# Patient Record
Sex: MALE | Race: WHITE | Employment: STUDENT | URBAN - METROPOLITAN AREA
[De-identification: names, ages, dates, MRNs, and addresses within clinical notes are randomized per-mention and may not be internally consistent; named-entity substitution may affect disease eponyms.]

---

## 2023-05-15 DIAGNOSIS — Z13.0 SCREENING FOR SICKLE-CELL DISEASE OR TRAIT: ICD-10-CM

## 2023-05-15 DIAGNOSIS — Z13.6 SCREENING FOR HEART DISEASE: Primary | ICD-10-CM

## 2023-05-22 ENCOUNTER — OFFICE VISIT (OUTPATIENT)
Dept: FAMILY MEDICINE | Facility: CLINIC | Age: 23
End: 2023-05-22
Payer: COMMERCIAL

## 2023-05-22 ENCOUNTER — HOSPITAL ENCOUNTER (OUTPATIENT)
Dept: CARDIOLOGY | Facility: CLINIC | Age: 23
Discharge: HOME | End: 2023-05-22
Attending: FAMILY MEDICINE | Admitting: FAMILY MEDICINE
Payer: COMMERCIAL

## 2023-05-22 VITALS
BODY MASS INDEX: 24.14 KG/M2 | HEART RATE: 62 BPM | DIASTOLIC BLOOD PRESSURE: 89 MMHG | HEIGHT: 69 IN | SYSTOLIC BLOOD PRESSURE: 153 MMHG | WEIGHT: 163 LBS

## 2023-05-22 DIAGNOSIS — Z02.5 SPORTS PHYSICAL: Primary | ICD-10-CM

## 2023-05-22 PROCEDURE — 93005 ELECTROCARDIOGRAM TRACING: CPT

## 2023-05-22 NOTE — PROGRESS NOTES
"Vishal Berg presents for PPE for football  No health concerns  No HI or SI and no mental health concerns    Vitals: BP (!) 153/89   Pulse 62   Ht 1.753 m (5' 9\")   Wt 73.9 kg (163 lb)   BMI 24.07 kg/m    BMI= Body mass index is 24.07 kg/m .  Sport(s): Football    Vision: Right Eye: 20/20 Left Eye: 20/20 Both Eyes: 20/20  Correction: none  Pupils: equal    Sickle Cell Trait: Discussed  Concussions: Concussion fact sheet reviewed. Student Athlete gave written and verbal agreement to report any suspected concussions.    General/Medical  Eyes/Vision: Normal  Ears/Hearing: Normal  Nose: Normal  Mouth/Dental: Normal  Throat: Normal  Thyroid: Normal  Lymph Nodes: Normal  Lungs: Normal  Abdomen: Normal    Skin: Normal    Cardiovascular Screening  RRR  Heart Murmur:No Grade: NA  Symmetric Femoral pulses: Yes    Stigmata of Marfan's Syndrome - if appropriate:  Not applicable    Assessment/Plan  21 yo male here for sports physical, health  Reviewed EKG: normal  Negative sickle cell  Dr Watkins    COMMENTS, RECOMMENDATIONS and PARTICIPATION STATUS  Cleared    "

## 2023-05-22 NOTE — LETTER
"  5/22/2023      RE: Vishal Berg  [address] [facility] [address]     Dear Colleague,    Thank you for referring your patient, Vishal Berg, to the  [facility]. Please see a copy of my visit note below.    Vishal Berg presents for PPE for football  No health concerns  No HI or SI and no mental health concerns    Vitals: BP (!) 153/89   Pulse 62   Ht 1.753 m (5' 9\")   Wt 73.9 kg (163 lb)   BMI 24.07 kg/m    BMI= Body mass index is 24.07 kg/m .  Sport(s): Football    Vision: Right Eye: 20/20 Left Eye: 20/20 Both Eyes: 20/20  Correction: none  Pupils: equal    Sickle Cell Trait: Discussed  Concussions: Concussion fact sheet reviewed. Student Athlete gave written and verbal agreement to report any suspected concussions.    General/Medical  Eyes/Vision: Normal  Ears/Hearing: Normal  Nose: Normal  Mouth/Dental: Normal  Throat: Normal  Thyroid: Normal  Lymph Nodes: Normal  Lungs: Normal  Abdomen: Normal    Skin: Normal    Cardiovascular Screening  RRR  Heart Murmur:No Grade: NA  Symmetric Femoral pulses: Yes    Stigmata of Marfan's Syndrome - if appropriate:  Not applicable    Assessment/Plan  21 yo male here for sports physical, health  Reviewed EKG: normal  Negative sickle cell  Dr Watkins    COMMENTS, RECOMMENDATIONS and PARTICIPATION STATUS  Cleared        Again, thank you for allowing me to participate in the care of your patient.      Sincerely,    Vishal Watkins MD    "

## 2023-05-24 LAB
ATRIAL RATE - MUSE: 67 BPM
DIASTOLIC BLOOD PRESSURE - MUSE: NORMAL MMHG
INTERPRETATION ECG - MUSE: NORMAL
P AXIS - MUSE: 61 DEGREES
PR INTERVAL - MUSE: 124 MS
QRS DURATION - MUSE: 94 MS
QT - MUSE: 364 MS
QTC - MUSE: 384 MS
R AXIS - MUSE: 69 DEGREES
SYSTOLIC BLOOD PRESSURE - MUSE: NORMAL MMHG
T AXIS - MUSE: 37 DEGREES
VENTRICULAR RATE- MUSE: 67 BPM

## 2023-09-07 ENCOUNTER — DOCUMENTATION ONLY (OUTPATIENT)
Dept: FAMILY MEDICINE | Facility: CLINIC | Age: 23
End: 2023-09-07

## 2023-09-12 NOTE — PROGRESS NOTES
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  [phone] Fax:  [phone]  Email:  [email or web address].          Anonymously report concerns to Asa at [facility] or call [phone]; or          Direct concerns to Katharine Wilkins,   for Health &  Performance, [facility], Athletic Medicine, [address] [place], [place] [id number],   [phone].  Client Bill of Rights & Privacy Notice  As a consumer of psychological services offered by psychologists licensed by the [facility], you have the right to:           Expect that the psychologist has met the minimal qualifications of training and  experience required by state law;           Examine public records maintained by the Board of Psychology, which contain the  credentials of the psychologist;           Obtain a copy of the rules of  conduct from the [facility] and Public Documents  Division, Department of Administration: [address], [place] [address];           Report complaints to the [place] Board of Psychology [address], [address] [place] [address] Phone:  [phone] Fax:  [phone]  Email:  [email or web address]           Be informed of the psychologist s areas of clinical competence as submitted to the  Board of Psychology.  [facility] s sport psychologists  competencies include:  o   Providing individual, group/team therapy and mental skills training to children,  adolescents and adults, and consultation to support staff, medical team members or other  organizational client stakeholders.  o   Providing sport psychology services to athletes, coaches, & sport personnel from all  competitive levels  o   Administration and interpretation of standardized measures of personality, cognitive  functioning, aptitudes, and interests to adolescents and adults  o   Designing and implementing psycho-educational workshops for, providing training to,  and teaching adolescents and adults  o   Conducting psychological research  o   Providing clinical supervision and training to psychology graduate students  o   Teaching educational courses in psychology  o   Providing organizational consulting services to family-owned and privately held  organizations           Be provided with a non-technical explanation about the nature and purpose of the  psychological procedures to be used in your treatment, upon request;           Be free from being the object of discrimination on the basis of race, Pentecostalism, gender, or  other unlawful categories while receiving psychological services;           Be informed of the cost of professional services before receiving the services;  Ramin Latham, Ph.D., , [facility] ([id number]) License LP #[id number] [facility] [address] [place] [address]  [phone]  [facility], [facility] [address] [place] [address] [phone] [email or web address]  Page 5 of 10           Be free from exploitation for the benefit or advantage of the psychologist;           Privacy as defined by rule and law;           Have access to your records as provided in subpart 1a and [place] Statutes section  144.335 subdivision 2:  o   Upon request, the psychologist will supply complete and current information in nontechnical language about your diagnosis, treatment, and prognosis if you meet criteria for a  mental health diagnosis.  o   Upon written request, the psychologist will promptly furnish copies of your records or,  with your consent, a summary of the record.  o   If the psychologist reasonably determines that the information you have requested  would be detrimental to your physical and mental health, the psychologist may withhold  that information from you and supply it instead to an appropriate third party or other  treatment provider.  That third party or treatment provider may release the information to  you.  o   The psychologist may also withhold information that you have requested if, prior to your  request, that psychologist has defined and described a specific basis for withholding that  information.           Be informed about disclosures of your private records that may be made without your  written consent.  Your information shared with the psychologist will be kept confidential  unless you are in imminent risk of hurting yourself, you are in imminent risk of hurting  another person, you know of minors or vulnerable adults who are being hurt or neglected,  or if you are a woman who is pregnant and using certain classes of illicit drugs.  In those  situations, appropriate emergency or health care personnel will be contacted in order to  address those safety issues.  If you have been exploited or abused by a previous  psychological  treatment provider, that provider s licensing board will be contacted.   Additionally, if a subpoena is issued and requires that a copy of your counseling records be  turned over, the psychologist will be required to provide a copy of your records to comply  with the court order. If you have concerns about the services you have been provided, you  may also choose to file a complaint to your psychologist s supervisor.  HOW HEALTH INFORMATION MAY BE USED AND DISCLOSED AND HOW YOU CAN GET ACCESS TO  THIS INFORMATION. PLEASE REVIEW IT CAREFULLY.  [facility] understands that health information about you and your  health care is personal. We are committed to protecting health information about you in  compliance with the Federal Health Insurance Portability and Accountability Act of 1996  ( HIPPA ), the [place] Health Records Act, and other applicable Federal and State laws  and administrative regulations. We create a record of the care and services you receive. We  need this record to provide you with quality care and to comply with certain legal  requirements. This notice applies to all of the records of your care generated by [facility]. This notice will tell you about the ways in which we can use and  disclose health information about you. We also describe your rights to the health  information we keep about you, and describe certain obligations we have regarding the use  and disclosure of your health information. We are required by law to:   Make sure that Protected Health Information ( PHI ) that identifies you is kept private;   Give you this notice of our legal duties and privacy practices with respect to health  Ramin Latham, Ph.D., , [facility] ([id number]) License  #[id number] [facility] [address] [place] [address] [phone]  [facility] [address] [place] [address] [phone]  [email or web address]  Page 6 of 10  information;   Follow the terms of the notice that is currently in effect;   We can change the terms of this notice, and such changes will apply to all information we  have about you. The new notice will be available upon request, in our office, and on our  website;  HOW WE MAY USE AND DISCLOSE HEALTH INFORMATION ABOUT YOU  The following categories describe different ways that we use and disclose health  information. For each category of uses or disclosures we will explain what we mean and try  to give some examples. Not every use or disclosure in a category will be listed. However, all  of the ways we are permitted to use and disclose information will fall within one of the  categories.  For Treatment Payment, or Health Care Operations: Federal privacy rules and regulations  allow health care providers who have direct treatment relationship with the patient/client to  use or disclose the patient/client s personal health information without the patient s written  authorization in order to carry out the health care provider s own treatment, payment or  health care operations. We may also disclose your protected health information for the  treatment activities of any health care provider. This too can be done without your written  authorization. For example, if a clinician were to consult with another licensed health care  provider about your condition, we would be permitted to use and disclose your personal  health information which is otherwise confidential, in order to assist the clinician in  diagnosis and treatment of your mental health condition.  Disclosures for treatment purposes are not limited to the minimum necessary standard,  because therapists and other health care providers need access to the full record and/or full  and complete information in order to provide quality care. The word  treatment  includes,  among other things, the coordination and management  of health care providers with a third  party, consultations between health care providers, and referrals of a patient for health care  from one health care provider to another.  Lawsuits and Disputes: If you are involved in a lawsuit, we may disclose health information  in response to a court or administrative order. We may also disclose health information  about your child in response to a subpoena, discovery request, or other lawful process by  someone else involved in the dispute, but only if efforts have been made to tell you about  the request or to obtain an order protecting the information requested.  CERTAIN USES AND DISCLOSURES REQUIRE YOUR AUTHORIZATION  1.     Psychotherapy Notes. We do keep  psychotherapy notes  as that term is defined in 45  CFR   164.501, and any use or disclosure of such notes requires your authorization unless  the use or disclosure is:  a) For our use in treating you; b) For our use in training or supervising mental health  practitioners to help them improve their skills in group, joint, family, or individual counseling  or therapy; c) For our use in defending [facility] in legal proceedings  instituted by you; d) For use by the  of Health and Human Services to investigate  our compliance with HIPAA and other applicable laws and regulations; e) Required by law  where the use or disclosure is limited to the requirements of such law; f) Required by law for  certain health oversight activities pertaining to the originator of the psychotherapy notes;    g) Required by a  who is performing duties authorized by law; h) Required to help  Ramin Latham, Ph.D., , [facility] ([id number]) License LP #[id number] [facility] [address] [place] [address] [phone]  [place] [facility], [facility] [address] [place], [place] [address] [phone] [email or web address]."[employer]  Page 7 of 10  avert a serious threat to the health  and safety of others;  2.     Marketing Purposes. We will NOT use or disclose your Protected Health Information  ( PHI ) for marketing purposes.  3.     Sale of PHI. We will NOT sell your Protected Health Information ( PHI ).  CERTAIN USES AND DISCLOSURES DO NOT REQUIRE YOUR AUTHORIZATION  Subject to certain limitations in the law, we can use and disclose your Protected Health  Information ( PHI ) without your authorization for the following reasons:  1.     When disclosure is required by state or federal law, and the use or disclosure complies  with and is limited to the relevant requirements of such law;  2.     For public health activities, including reporting suspected child, elder, or dependent  adult abuse, or preventing or reducing a serious threat to anyone s health or safety;  3.     For health oversight activities, including audits and investigations;  4.     For judicial and administrative proceedings, including responding to a court or  administrative order, although our preference is to obtain an authorization from you before  doing so;  5.     For law enforcement purposes, including reporting crimes occurring on our premises;  6.     To coroners or medical examiners, when such individuals are performing duties  authorized by law;  7.     For research purposes, including studying and comparing the mental health of patients  who received one form of therapy versus those who received another form of therapy for  the same condition;  8.     Specialized government functions, including, ensuring the proper execution of   missions; protecting the ; conducting intelligence or counterintelligence operations; or, helping to ensure the safety of those working within or housed in  correctional institutions;  9.     For workers' compensation purposes. Although our preference is to obtain an  authorization from you, we may provide your Protected Health Information ( PHI ) in  order  to comply with workers' compensation laws;  10.  Appointment reminders and health related benefits or services. We may use and  disclose your Protected Health Information ( PHI ) to contact you to remind you that you  have an appointment with us. We may also use and disclose your Protected Health  Information ( PHI ) to tell you about treatment alternatives, or other health care services or  benefits that we offer.  YOU TO HAVE THE RIGHT TO OBJECT TO CERTAIN USES AND DISCLOSURES  You have the right to object to disclosures to family, friends, or others. We may provide your  Protected Health Information ( PHI ) to a family member, friend, or other person that you  indicate is involved in your care or the payment for your health care, unless you object in  whole or in part. The opportunity to consent or object may be obtained retroactively in  emergency situations.  YOU HAVE THE FOLLOWING RIGHTS WITH RESPECT TO YOUR PROTECTED HEALTH  INFORMATION ( PHI )  1.     The Right to Request Limits on Uses and Disclosures of Your Protected Health  Ramin Latham, Ph.D., [facility] ([id number]) License  #[id number] [place] [facility] [address] [place] [address] [phone]  [place] [facility], [address], [address] [place], [place] [address] [phone] www.[email or web address]  Page 8 of 10  Information ( PHI ). You have the right to ask us not to use or disclose certain Protected  Health Information ( PHI ) for treatment, payment, or health care operations purposes. We  are not required to agree to your request, and may say  no  if we believe it would affect your  health care;  2.     The Right to Choose How We Send Protected Health Information ( PHI ) to You. You  have the right to ask us to contact you in a specific way (for example, home or office phone)  or to send mail to a different address, and we will agree to all reasonable requests;  3.     The Right to See and Get Copies  of Your Protected Health Information ( PHI ).  You  have the right to get an electronic or paper copy of your medical record and other  information that we have about you.  Pursuant to [place] law, you also have the right to  obtain an electronic or paper copy of  psychotherapy notes.  We will provide you with a copy  of your record, or a summary of it, if you agree to receive a summary, within 30 days of  receiving your written request, and we may charge a reasonable, cost based fee for doing  so;  4.     The Right to Get a List of the Disclosures We Have Made. You have the right to  request a list of instances in which we have disclosed your Protected Health Information  ( PHI ) for purposes other than treatment, payment, or health care operations, or for which  you provided us with an Authorization. We will respond to your request for an accounting of  disclosures within 60 days of receiving your request. The list we will give you will include  disclosures made in the last six years unless you request a shorter time. We will provide the  list to you at no charge, but if you make more than one request in the same year, we will  charge you a reasonable cost based fee for each additional request;  5.     The Right to Correct or Update Your Protected Health Information ( PHI ). If you  believe that there is a mistake in your Protected Health Information ( PHI ), or that a piece of  important information is missing from your Protected Health Information ( PHI ), you have  the right to request that [facility], [facility] correct the existing information or  add the missing information. We may say  no  to your request, but we will tell you why in  writing within 60 days of receiving your request;  6.     The Right to Get a Paper or Electronic Copy of this Notice. You have the right get a  paper copy of this Notice, and you have the right to get a copy of this notice by e-mail. And,  even if you have agreed to  receive this Notice via e-mail, you also have the right to request a  paper copy of it;  7.     The Right to Authorize Another to Exercise Your Rights on Your Behalf.  You have the  right to execute a Medical Power of  that authorizes another person to exercise  your HIPPA and [place] Medical Records Act rights on your behalf.  ACKNOWLEDGMENT OF RECEIPT OF PRIVACY NOTICE  Under the Health Insurance Portability and Accountability Act of 1996 (HIPAA) and the  [place] Health Records Act, you have certain rights regarding the use and disclosure of  your protected health information. By your signature or by checking the box below, you are  acknowledging that you have received a copy of [facility] Notice of Privacy Practices.  Mental Health Resources  [facility]  Urgent counselors are available in person and by phone between 8 a.m. - 4:30 p.m. on  Monday, Tuesday, Wednesday, and Friday; 9 a.m. - 4:30 p.m. on Thursdays. Call [phone]  Ramin Latham, Ph.D., [facility] ([id number]) License  #[id number] [place] [facility] [address] [place] [address] [phone]  [facility], [address], [address] [place] [address] [phone] www.[employer]  Page [phone] to speak with an urgent counselor or come to the Mental Health Clinic on the Fourth  floor of [facility] located on the [place] at [address] [place] [address] SE. These services are  not the same as those available in an emergency room and should not be substituted for a  situation requiring immediate intervention. There may be a wait to speak with an urgent  counselor.   Student Counseling Services  [phone].  Walk-in crisis counseling is offered from 8:00 a.m. to 4:00 p.m. at the  [facility] location at [address] [place] [address], [address] [place] [address] SE. These services  are not the same as those available in an emergency room and should not be substituted  for a situation  "requiring immediate intervention.   De-Stress - Schedule a  stress check-in  to get 1-session help with great ways to manage the  stresses of student life. You can sign up for a free, confidential and meet with trained  student helpers who know first-hand the stresses of [email or web address]; [phone]  Crisis Support  If you are in a life-threatening emergency, call 911. Or you may call the Crisis Connection at  (107) 559-3976, text \"UMN\" to 43274 on evenings and weekends if you feel unsafe.  Additional State and National Helpline Information  Crisis Text Line - Text HOME to 757692  Suicide Prevention Lifeline - 892-552-POSS (1468)  Suicide Hotline in Trinidadian: 7-846-659-6540  LGBT Youth Suicide Hotline: 5-366-1-U-LACHELLE  Agreement  My signature below indicates I have read and agree to the above information in its entirety  and agree to abide by these terms during our professional relationship. This document also  serves as an acknowledgement that I have reviewed and read the Notice of Privacy practices  and the Client Bill of Rights.   By signing this form, I consent to and authorize my sport  psychology professional to assess and provide psychological services to me. I understand  that my sport psychology professional is available to explain the purpose of sport  psychology services and that I have the right to refuse services.  Client  Vishal Berg  Signed by Vishal Berg  September 6, 2023 at 7:49 pm  IP address: [phone]  Ramin Latham, Ph.D., , [facility] ([id number]) License  #[id number] [facility] [place] [id number] [place] [address] [phone]  [facility], [facility] [address] [place], [place] [address] [phone] [email or web address]  Page 10 of  1     "

## 2023-09-18 ENCOUNTER — DOCUMENTATION ONLY (OUTPATIENT)
Dept: FAMILY MEDICINE | Facility: CLINIC | Age: 23
End: 2023-09-18

## 2023-09-18 NOTE — PROGRESS NOTES
[facility] ATHLETIC MEDICINE  [facility] (formerly [facility]   Sport Psychology Progress Note      Location of Visit: [facility] Athletic Department - [address]  Date of Visit: 9/18/23  Duration of Session: 45 minutes  Referred by: self    Vishal Berg presented on time for sport psychology appointment.     Emergency Contact Name and Relation to you (e.g., parent, guardian, , etc.)  Roxi Rodgers    Emergency Contact Cell #:  [phone]    What brings you into Sport Psychology at this time?  Injury and just to talk    Vishal Berg is a 22 year old white male.  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     Self-reported Concerns/Symptoms:  Injury  Sport performance  transition    Suicide and Risk Assessment:  Recent suicidal thoughts: No  Past suicidal thoughts: No  Recent homicidal thoughts: No  Any attempts in the past: No  Any family/friends/loved ones die by suicide: No  Plan or considering various methods: No  Access to guns: No  Protective factors: no h/o suicide attempt, no plan or intent, no h/o risky impulsive behavior, no access to lethal means, h/o seeking help when needed, future oriented, feeling hopeful, none to minimal alcohol use , commitment to family, good social support  , and stable housing  Verbal contract for safety: Yes    Vishal denies current urges to self-harm, homicidal ideation, suicidal ideation, means, plans, or intent.    Mental Status & Observations:  Vishal appeared generally alert and oriented. Dress was appropriate to the weather and occasion. Grooming and hygiene were appropriate. Eye contact was good. Speech was of normal volume and normal. Mood was appropriate with congruent affect. Thought processes were relevant, logical and goal-directed. Thought content was within normal limits with no evidence of psychotic or paranoid features. Memory appeared intact. Insight and judgment appeared age appropriate  "with good focus in session.  He exhibited normal motor activity during the appointment.  Behavior was actively engaged, candid, cooperative, and open.     Response & Observations:  Vishal reports that he is noticing feeling nervous for his kicks due to limited reps and perceived high pressure to do well. He is not starting currently. He reports working to navigate the sport environment.     Intervention:   Provided psychoeducation about the ANS and the importance of diaphragmatic breathing. Educated and guided Vishal through how to do effective breathing techniques to use in sport. Introduced the skillo f imagery and vivid imagery for rehearsal of skills and responses to adversity and varying scenarios. Also discussed additional skills such as 1/2 smile from Dialectical behavior therapy distress tolerance module and the skill of having a mindset of less self-consciousness. Emailed resources [see below]    Coping skills, strengths and supports:   Communication skills, Coping skills, Exercise, Family support, Insight and sensitivity, Maturity and judgment, Relationship stability, Hoahaoism/spirituality , Socioeconomic stability, Social support system, and Use of available services    Briefly discussed his growth-mindset, leaning on social support and his juan jose when struggling, honoring that he is the expert on himself, and to not take things as personally from coaches. He reports a desire to \"work with\" the situation he is in as best he can.     Goals for counseling:  Vishal reports that his goals are: 1) managing the anxiety that emerges from his interactions with is sport environment/, and learn how to interact with it in a way that does not let it \"interfere\".      Clinical impressions or Other:  R/O 309.28 Adjustment Disorder w/ anxiety and depressed mood    Therapy objectives/goals:  Assist with transition into college  Build resilience and response to adversity  Decrease anxiety symptoms  Decrease depressive " "symptoms  Decrease perceived stress  Enhance self-care  Increase mindfulness and the ability to be present  Increase self-esteem and self-worth  Improve mood  Provide support  Support injury/recovery  Teach and improve coping skills  Help with transition into new sport culture; and coping with coaching dynamics    Therapy follow-up plan:  Individual counseling sessions as needed  Follow up with sports medicine physician      Ramin Latham, PhD , [facility]      EMAIL:  Ramin Latham <[email or web address]>  11:44?AM (7 minutes ago)  to Vishal      The concept behind the \"F-it Mindset\" is more a lack of self-consciousness and not worrying about what others are thinking about you or your performance. And less self-consciousness tends to be associated with better sport performance & Flow states.  Helps us actually be free to focus on just performing and doing our thing....not being self-critical or being on the 'outside looking in' at ourselves and judging our performance...or ourselves in life, for that matter. :)      https://[employer]/santo-ioana-flips-off-dad-every-race/      https://www.psychologytoday.com/blog/the-power-prime/201307/what-it-takes-get-the-f-it-attitude        Also, you might like the CALM.[email or web address] for anxiety guided tips.      Dr.C"

## 2023-09-28 ENCOUNTER — DOCUMENTATION ONLY (OUTPATIENT)
Dept: FAMILY MEDICINE | Facility: CLINIC | Age: 23
End: 2023-09-28

## 2023-09-28 NOTE — PROGRESS NOTES
[facility] ATHLETIC MEDICINE  [facility]   Sport Psychology Progress Note      Location of Visit: [facility] Athletic Department - [address]  Date of Visit: 9/28/23  Duration of Session: 45 minutes  Referred by: self    Vishal Berg presented on time for sport psychology appointment.     Emergency Contact Name and Relation to you (e.g., parent, guardian, , etc.)  Roxi Rodgers    Emergency Contact Cell #:  [phone]    What brings you into Sport Psychology at this time?  Injury and just to talk    Vishal Berg is a 22 year old white male.  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     Self-reported Concerns/Symptoms:  Injury  Sport performance  transition    Suicide and Risk Assessment:  Recent suicidal thoughts: No  Past suicidal thoughts: No  Recent homicidal thoughts: No  Any attempts in the past: No  Any family/friends/loved ones die by suicide: No  Plan or considering various methods: No  Access to guns: No  Protective factors: no h/o suicide attempt, no plan or intent, no h/o risky impulsive behavior, no access to lethal means, h/o seeking help when needed, future oriented, feeling hopeful, none to minimal alcohol use , commitment to family, good social support  , and stable housing  Verbal contract for safety: Yes    Vishal denies current urges to self-harm, homicidal ideation, suicidal ideation, means, plans, or intent.    Mental Status & Observations:  Vishal appeared generally alert and oriented. Dress was appropriate to the weather and occasion. Grooming and hygiene were appropriate. Eye contact was good. Speech was of normal volume and normal. Mood was appropriate with congruent affect. Thought processes were relevant, logical and goal-directed. Thought content was within normal limits with no evidence of psychotic or paranoid features. Memory appeared intact. Insight and judgment appeared age appropriate  "with good focus in session.  He exhibited normal motor activity during the appointment.  Behavior was actively engaged, candid, cooperative, and open.     Response & Observations:  Vishal reports that he is kicking well, managing his sport environment better and feels as though his pre-kick routine is improved. However he reports noticing anxiety with football, particularly with a strong need to do reps after his kicks.     Intervention:   Reviewed psychoeducation about the ANS fight-flight-freeze response and the importance of diaphragmatic breathing, and where to use breathing in his pre-kick routine. Also discussed his anxiety (OCD-like) \"compulsion\" with needing to \"do reps\" of a kick-drill after kicks, where he notes feeling anxious if he does not keep doing them. Provided education about tolerating discomfort/anxiety, exposure/response prevention, and aligning his behavior with his goals and values. Reflected on his observation of himself as a more relaxed \"chill\" player in  and playing really well then and how he'd like to return tho that approach to his kicking. He notes over-thinking and \"trying too hard\" at times here. He shared that perspective-taking and gratitude have been particularly helpful to him in learning to navigate his sport environment.     Coping skills, strengths and supports:   Communication skills, Coping skills, Exercise, Family support, Insight and sensitivity, Maturity and judgment, Relationship stability, Rastafarian/spirituality , Socioeconomic stability, Social support system, and Use of available services    Briefly discussed his growth-mindset, leaning on social support and his juna jose when struggling, honoring that he is the expert on himself, and to not take things as personally from coaches. He reports a desire to \"work with\" the situation he is in as best he can.     Goals for counseling:  Vishal reports that his goals are: 1) managing the anxiety that emerges from his interactions " "with is sport environment/, and learn how to interact with it in a way that does not let it \"interfere\".      Clinical impressions or Other:  R/O 309.28 Adjustment Disorder w/ anxiety and depressed mood    Therapy objectives/goals:  Assist with transition into college  Build resilience and response to adversity  Decrease anxiety symptoms  Decrease depressive symptoms  Decrease perceived stress  Enhance self-care  Increase mindfulness and the ability to be present  Increase self-esteem and self-worth  Improve mood  Provide support  Support injury/recovery  Teach and improve coping skills  Help with transition into new sport culture; and coping with coaching dynamics    Therapy follow-up plan:  Individual counseling sessions as needed  Follow up with sports medicine physician      Ramin Latham, PhD LP, [facility]      "

## 2023-10-19 ENCOUNTER — DOCUMENTATION ONLY (OUTPATIENT)
Dept: FAMILY MEDICINE | Facility: CLINIC | Age: 23
End: 2023-10-19

## 2023-10-19 NOTE — PROGRESS NOTES
[facility] ATHLETIC MEDICINE  [facility]   Sport Psychology Progress Note      Location of Visit: [facility] Athletic Department - [address]  Date of Visit: 10/19/23  Duration of Session: 45 minutes  Referred by: self    Vishal Berg presented on time for sport psychology appointment.     Emergency Contact Name and Relation to you (e.g., parent, guardian, , etc.)  Roxi Rodgers  Emergency Contact Cell #:  [phone]    What brings you into Sport Psychology at this time?  Injury and just to talk    Vishal Berg is a 22 year old white male.  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     Self-reported Concerns/Symptoms:  Injury  Sport performance  transition    Suicide and Risk Assessment:  Recent suicidal thoughts: No  Past suicidal thoughts: No  Recent homicidal thoughts: No  Any attempts in the past: No  Any family/friends/loved ones die by suicide: No  Plan or considering various methods: No  Access to guns: No  Protective factors: no h/o suicide attempt, no plan or intent, no h/o risky impulsive behavior, no access to lethal means, h/o seeking help when needed, future oriented, feeling hopeful, none to minimal alcohol use , commitment to family, good social support  , and stable housing  Verbal contract for safety: Yes    Vishal denies current urges to self-harm, homicidal ideation, suicidal ideation, means, plans, or intent.    Mental Status & Observations:  Vishal appeared generally alert and oriented. Dress was appropriate to the weather and occasion. Grooming and hygiene were appropriate. Eye contact was good. Speech was of normal volume and normal. Mood was appropriate with congruent affect. Thought processes were relevant, logical and goal-directed. Thought content was within normal limits with no evidence of psychotic or paranoid features. Memory appeared intact. Insight and judgment appeared age appropriate  "with good focus in session.  He exhibited normal motor activity during the appointment.  Behavior was actively engaged, candid, cooperative, and open.     Response & Observations:  Vishal reports that challenged himself to do less \"compulsive-like\" drills for kicking and is finding it difficult to resist the urge after a missed kick. He reports noticing anxiety with football and increased self-doubt after misses, particularly with a strong need to do reps after his kicks.     Intervention:   Used Acceptance and Commitment Therapy principles of aligning his behavioral responses with his goals and value, rather than his emotion. He was very responsive to this skill. Discussed measuring self-worth and confidence less on solely outcome and more on additional controllable elements such as preparation, worth ethic, attitude, response, having fun, trusting his process and his commitment level. Reinforced responding to anxiety in ways that align with his goals and values, particularly in the \"compulsive-like\" moments after missed kicks. Encouraged \"staying the course\" and resisting  the urge/tolerating the angst ,as a practice this week. Increased awareness at how this response can provide an opportunity for gaining mastery and confidence.  Used a metaphor of a personal \"north\" compass for himself.    Coping skills, strengths and supports:   Communication skills, Coping skills, Exercise, Family support, Insight and sensitivity, Maturity and judgment, Relationship stability, Evangelical/spirituality , Socioeconomic stability, Social support system, and Use of available services    Goals for counseling:  Vishal reports that his goals are: 1) managing the anxiety that emerges from his interactions with is sport environment/, and learn how to interact with it in a way that does not let it \"interfere\".      Clinical impressions or Other:  R/O 309.28 Adjustment Disorder w/ anxiety    Therapy objectives/goals:  Assist with " transition into college  Build resilience and response to adversity  Decrease anxiety symptoms  Decrease depressive symptoms  Decrease perceived stress  Enhance self-care  Increase mindfulness and the ability to be present  Increase self-esteem and self-worth  Improve mood  Provide support  Support injury/recovery  Teach and improve coping skills  Help with transition into new sport culture; and coping with coaching dynamics    Therapy follow-up plan:  Individual counseling sessions as needed  Follow up with sports medicine physician      Ramin Latham, PhD LP, [facility]

## 2023-11-16 ENCOUNTER — DOCUMENTATION ONLY (OUTPATIENT)
Dept: FAMILY MEDICINE | Facility: CLINIC | Age: 23
End: 2023-11-16

## 2023-11-16 NOTE — PROGRESS NOTES
[facility] ATHLETIC MEDICINE  [facility]   Sport Psychology Progress Note      Location of Visit: [facility] Athletic Department - [address]  Date of Visit: 11/16/23  Duration of Session: 45 minutes  Referred by: self    Vishal Berg presented on time for sport psychology appointment.     Emergency Contact Name and Relation to you (e.g., parent, guardian, , etc.)  Roxi Rodgers  Emergency Contact Cell #:  [phone]    What brings you into Sport Psychology at this time?  Injury and just to talk    Vishal Berg is a 22 year old white male.  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     Self-reported Concerns/Symptoms:  Injury  Sport performance  transition    Suicide and Risk Assessment:  Recent suicidal thoughts: No  Past suicidal thoughts: No  Recent homicidal thoughts: No  Any attempts in the past: No  Any family/friends/loved ones die by suicide: No  Plan or considering various methods: No  Access to guns: No  Protective factors: no h/o suicide attempt, no plan or intent, no h/o risky impulsive behavior, no access to lethal means, h/o seeking help when needed, future oriented, feeling hopeful, none to minimal alcohol use , commitment to family, good social support  , and stable housing  Verbal contract for safety: Yes    Vishal denies current urges to self-harm, homicidal ideation, suicidal ideation, means, plans, or intent.    Mental Status & Observations:  Vishal appeared generally alert and oriented. Dress was appropriate to the weather and occasion. Grooming and hygiene were appropriate. Eye contact was good. Speech was of normal volume and normal. Mood was appropriate with congruent affect. Thought processes were relevant, logical and goal-directed. Thought content was within normal limits with no evidence of psychotic or paranoid features. Memory appeared intact. Insight and judgment appeared age appropriate  "with good focus in session.  He exhibited normal motor activity during the appointment.  Behavior was actively engaged, candid, cooperative, and open.     Response & Observations:  Vishal reports that challenged himself to do less \"compulsive-like\" drills for kicking and is finding it difficult to resist the urge after a missed kick. He reports noticing anxiety with football and increased self-doubt after misses, particularly with a strong need to do reps after his kicks. He reports some cognitive rigidity at times in his life with a need to stretch nightly or notices procrastination with his homework when anxious. He reports that overall he has felt he's performing well and has 2 - 3 years of football eligibility remaining.    Intervention:   Spent time reflecting on his tendency to be somewhat rigid with his behaviors at times, especially when anxious. Explored ways he can shift his focus/behavior at practice to be more aligned with his goals of \"being adaptable\" as well as way she can do this with stretching at home and other behaviors where he notices some rigidity at times. Reviewed Acceptance and Commitment Therapy principles of aligning his behavioral responses with his goals and value, rather than his emotion.     Coping skills, strengths and supports:   Communication skills, Coping skills, Exercise, Family support, Insight and sensitivity, Maturity and judgment, Relationship stability, Worship/spirituality , Socioeconomic stability, Social support system, and Use of available services    Goals for counseling:  Vishal reports that his goals are: 1) managing the anxiety that emerges from his interactions with is sport environment/, and learn how to interact with it in a way that does not let it \"interfere\".      Clinical impressions or Other:  R/O 309.28 Adjustment Disorder w/ anxiety  R/O Obsessive-Compulsive Disorder    Therapy objectives/goals:  Assist with transition into college  Build resilience and " response to adversity  Decrease anxiety symptoms  Decrease depressive symptoms  Decrease perceived stress  Enhance self-care  Increase mindfulness and the ability to be present  Increase self-esteem and self-worth  Improve mood  Provide support  Support injury/recovery  Teach and improve coping skills  Help with transition into new sport culture; and coping with coaching dynamics    Therapy follow-up plan:  Individual counseling sessions as needed  Follow up with sports medicine physician      Ramin Latham, PhD LP, [facility]

## 2023-12-07 ENCOUNTER — DOCUMENTATION ONLY (OUTPATIENT)
Dept: FAMILY MEDICINE | Facility: CLINIC | Age: 23
End: 2023-12-07

## 2023-12-07 NOTE — PROGRESS NOTES
[facility] ATHLETIC MEDICINE  [facility]   Sport Psychology Progress Note      Location of Visit: [facility] - [address]  Date of Visit: 12/7/23  Duration of Session: 45 minutes  Referred by: self    Vishal Berg presented on time for sport psychology appointment.     Emergency Contact Name and Relation to you (e.g., parent, guardian, , etc.)  Roxi Rodgers  Emergency Contact Cell #:  [phone]    What brings you into Sport Psychology at this time?  Injury and just to talk    Vishal Berg is a 22 year old white male.  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     Self-reported Concerns/Symptoms:  Injury  Sport performance  transition    Suicide and Risk Assessment:  Recent suicidal thoughts: No  Past suicidal thoughts: No  Recent homicidal thoughts: No  Any attempts in the past: No  Any family/friends/loved ones die by suicide: No  Plan or considering various methods: No  Access to guns: No  Protective factors: no h/o suicide attempt, no plan or intent, no h/o risky impulsive behavior, no access to lethal means, h/o seeking help when needed, future oriented, feeling hopeful, none to minimal alcohol use , commitment to family, good social support  , and stable housing  Verbal contract for safety: Yes    Vishal denies current urges to self-harm, homicidal ideation, suicidal ideation, means, plans, or intent.    Mental Status & Observations:  Vishal appeared generally alert and oriented. Dress was appropriate to the weather and occasion. Grooming and hygiene were appropriate. Eye contact was good. Speech was of normal volume and normal. Mood was appropriate with congruent affect. Thought processes were relevant, logical and goal-directed. Thought content was within normal limits with no evidence of psychotic or paranoid features. Memory appeared intact. Insight and judgment appeared age appropriate with  "good focus in session.  He exhibited normal motor activity during the appointment.  Behavior was actively engaged, candid, cooperative, and open.     Response & Observations:  Vishal reports that he feels he is making progress on his increased awareness on his mental health. He also reports feeling as though he is gaining insight and maturity into not worrying as much about what others think and taking pride/confidence in himself to play for himself. He reports that he received feedback from his position  that was difficult to understand. He continues to challenge himself to do less \"compulsive-like\" drills for kicking and is finding it difficult to resist the urge after a missed kick. He reports noticing anxiety with football and increased self-doubt after misses. He reports some cognitive rigidity at times in his life with a need to stretch nightly or notices procrastination with his homework when anxious.     Intervention:   Explored and processed his recent interactions and meeting with his . Discussed his interpretation of feedback, his increased awareness and expertise on himself and learning that coaches/others also have their own lens/values that may differ from his. He shared learning a lot from a fellow upperclassman in his position on how to maintain confidence in himself regardless of others' opinions of him. Discussed and highlighted his own personal values, character tenants and emotional maturity that he feels is based increasingly on growth rather than fear. Reviewed Acceptance and Commitment Therapy principles of aligning his behavioral responses with his goals and value, rather than his emotion, particularly when anxious.    Coping skills, strengths and supports:   Communication skills, Coping skills, Exercise, Family support, Insight and sensitivity, Maturity and judgment, Relationship stability, Spiritism/spirituality , Socioeconomic stability, Social support system, and Use of available " "services    Goals for counseling:  Vishal reports that his goals are: 1) managing the anxiety that emerges from his interactions with is sport environment/, and learn how to interact with it in a way that does not let it \"interfere\".      Clinical impressions or Other:  309.28 Adjustment Disorder w/ anxiety  Obsessive-Compulsive Disorder    Therapy objectives/goals:  Assist with transition into college  Build resilience and response to adversity  Decrease anxiety symptoms  Decrease depressive symptoms  Decrease perceived stress  Enhance self-care  Increase mindfulness and the ability to be present  Increase self-esteem and self-worth  Improve mood  Provide support  Support injury/recovery  Teach and improve coping skills  Help with transition into new sport culture; and coping with coaching dynamics    Therapy follow-up plan:  Individual counseling sessions as needed  Follow up with sports medicine physician      Ramin Latham, PhD LP, CMPC      "

## 2024-12-27 ENCOUNTER — OFFICE VISIT (OUTPATIENT)
Dept: ORTHOPEDICS | Facility: CLINIC | Age: 24
End: 2024-12-27
Payer: COMMERCIAL

## 2024-12-27 DIAGNOSIS — R19.8 ABDOMINAL WALL ASYMMETRY: Primary | ICD-10-CM

## 2024-12-28 DIAGNOSIS — M25.559 PAIN IN JOINT, PELVIC REGION AND THIGH: Primary | ICD-10-CM

## 2024-12-29 ENCOUNTER — ANCILLARY PROCEDURE (OUTPATIENT)
Dept: MRI IMAGING | Facility: CLINIC | Age: 24
End: 2024-12-29
Attending: ORTHOPAEDIC SURGERY
Payer: COMMERCIAL

## 2024-12-29 DIAGNOSIS — M25.559 PAIN IN JOINT, PELVIC REGION AND THIGH: ICD-10-CM

## 2024-12-29 PROCEDURE — 72195 MRI PELVIS W/O DYE: CPT | Performed by: RADIOLOGY

## 2024-12-31 NOTE — PROGRESS NOTES
Vishal Berg Is a very pleasant 24-year-old male.  Well-known to me.  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  He stated injury to his right groin approximately 3 days prior.  This was seen quickly by our  following practice who did note a mass in the area of his groin.    He has a history of an injury to his pectineus on the side.    Examination today shows normal hip range of motion.  There is a small prominence over the anterior inguinal aspect of his right abdominal wall.  Hip exam seems largely normal.    Clinical assessment: Right hernia?    Plan: I will get an MRI of his abdominal wall to evaluate this area in more details I do think there is a prominence here and certainly  Both the athlete and our  corroborated this as well.    Addendum: Pelvis MRI was reviewed which does show on this field of imaging and intact and no definite inguinal hernia.  There is a relatively high grade injury to the medial aspect of the pectineus muscle at its pubic origin there is certainly some fluid in this area as well.    I discussed the case with radiologist stated they did not see an inguinal hernia on this field-of-view however we could consider a CT scan of his abdomen pelvis if our suspicion remains.  Will get an MRI of this when he returns for the bowl trip.  Otherwise he is cleared to participate in the interim